# Patient Record
Sex: FEMALE | Race: WHITE | NOT HISPANIC OR LATINO
[De-identification: names, ages, dates, MRNs, and addresses within clinical notes are randomized per-mention and may not be internally consistent; named-entity substitution may affect disease eponyms.]

---

## 2017-03-01 ENCOUNTER — APPOINTMENT (OUTPATIENT)
Dept: FAMILY MEDICINE | Facility: CLINIC | Age: 57
End: 2017-03-01

## 2017-03-01 VITALS — DIASTOLIC BLOOD PRESSURE: 80 MMHG | TEMPERATURE: 98.6 F | SYSTOLIC BLOOD PRESSURE: 120 MMHG

## 2017-03-01 DIAGNOSIS — R20.0 ANESTHESIA OF SKIN: ICD-10-CM

## 2017-03-01 DIAGNOSIS — R20.2 ANESTHESIA OF SKIN: ICD-10-CM

## 2017-03-02 ENCOUNTER — OUTPATIENT (OUTPATIENT)
Dept: OUTPATIENT SERVICES | Facility: HOSPITAL | Age: 57
LOS: 1 days | End: 2017-03-02
Payer: COMMERCIAL

## 2017-03-02 ENCOUNTER — APPOINTMENT (OUTPATIENT)
Dept: RADIOLOGY | Facility: IMAGING CENTER | Age: 57
End: 2017-03-02

## 2017-03-02 DIAGNOSIS — N63 UNSPECIFIED LUMP IN BREAST: ICD-10-CM

## 2017-03-02 DIAGNOSIS — R92.2 INCONCLUSIVE MAMMOGRAM: ICD-10-CM

## 2017-03-02 DIAGNOSIS — R20.0 ANESTHESIA OF SKIN: ICD-10-CM

## 2017-03-02 DIAGNOSIS — R20.2 PARESTHESIA OF SKIN: ICD-10-CM

## 2017-03-02 DIAGNOSIS — Z00.00 ENCOUNTER FOR GENERAL ADULT MEDICAL EXAMINATION WITHOUT ABNORMAL FINDINGS: ICD-10-CM

## 2017-03-02 PROCEDURE — 72050 X-RAY EXAM NECK SPINE 4/5VWS: CPT

## 2017-03-02 PROCEDURE — 72050 X-RAY EXAM NECK SPINE 4/5VWS: CPT | Mod: 26

## 2017-03-02 PROCEDURE — 72070 X-RAY EXAM THORAC SPINE 2VWS: CPT | Mod: 26

## 2017-03-02 PROCEDURE — 72070 X-RAY EXAM THORAC SPINE 2VWS: CPT

## 2017-04-07 ENCOUNTER — OUTPATIENT (OUTPATIENT)
Dept: OUTPATIENT SERVICES | Facility: HOSPITAL | Age: 57
LOS: 1 days | End: 2017-04-07
Payer: COMMERCIAL

## 2017-04-07 ENCOUNTER — APPOINTMENT (OUTPATIENT)
Dept: ULTRASOUND IMAGING | Facility: IMAGING CENTER | Age: 57
End: 2017-04-07

## 2017-04-07 ENCOUNTER — APPOINTMENT (OUTPATIENT)
Dept: MAMMOGRAPHY | Facility: IMAGING CENTER | Age: 57
End: 2017-04-07

## 2017-04-07 DIAGNOSIS — Z00.8 ENCOUNTER FOR OTHER GENERAL EXAMINATION: ICD-10-CM

## 2017-04-07 PROCEDURE — 76641 ULTRASOUND BREAST COMPLETE: CPT

## 2017-04-07 PROCEDURE — 77066 DX MAMMO INCL CAD BI: CPT

## 2017-04-07 PROCEDURE — G0279: CPT

## 2017-04-22 ENCOUNTER — APPOINTMENT (OUTPATIENT)
Age: 57
End: 2017-04-22

## 2017-05-03 ENCOUNTER — RX RENEWAL (OUTPATIENT)
Age: 57
End: 2017-05-03

## 2017-05-04 ENCOUNTER — RX RENEWAL (OUTPATIENT)
Age: 57
End: 2017-05-04

## 2017-08-01 ENCOUNTER — APPOINTMENT (OUTPATIENT)
Dept: FAMILY MEDICINE | Facility: CLINIC | Age: 57
End: 2017-08-01
Payer: COMMERCIAL

## 2017-08-01 VITALS — SYSTOLIC BLOOD PRESSURE: 120 MMHG | DIASTOLIC BLOOD PRESSURE: 70 MMHG | TEMPERATURE: 98 F

## 2017-08-01 PROCEDURE — 99213 OFFICE O/P EST LOW 20 MIN: CPT

## 2017-08-03 ENCOUNTER — APPOINTMENT (OUTPATIENT)
Dept: FAMILY MEDICINE | Facility: CLINIC | Age: 57
End: 2017-08-03
Payer: COMMERCIAL

## 2017-08-03 VITALS — SYSTOLIC BLOOD PRESSURE: 120 MMHG | TEMPERATURE: 98.6 F | DIASTOLIC BLOOD PRESSURE: 80 MMHG

## 2017-08-03 DIAGNOSIS — L23.7 ALLERGIC CONTACT DERMATITIS DUE TO PLANTS, EXCEPT FOOD: ICD-10-CM

## 2017-08-03 PROCEDURE — 99213 OFFICE O/P EST LOW 20 MIN: CPT | Mod: 25

## 2017-08-03 PROCEDURE — 96372 THER/PROPH/DIAG INJ SC/IM: CPT

## 2017-08-03 RX ORDER — TRIAMCINOLONE ACETONIDE 40 MG/ML
40 INJECTION, SUSPENSION INTRA-ARTICULAR; INTRAMUSCULAR
Qty: 1 | Refills: 0 | Status: COMPLETED | OUTPATIENT
Start: 2017-08-03

## 2017-08-03 RX ADMIN — Medication 0.5 MG/ML: at 00:00

## 2017-09-01 ENCOUNTER — APPOINTMENT (OUTPATIENT)
Dept: INFECTIOUS DISEASE | Facility: CLINIC | Age: 57
End: 2017-09-01
Payer: SELF-PAY

## 2017-09-01 DIAGNOSIS — Z71.89 OTHER SPECIFIED COUNSELING: ICD-10-CM

## 2017-09-01 PROCEDURE — 90471 IMMUNIZATION ADMIN: CPT | Mod: NC

## 2017-09-01 PROCEDURE — 99401 PREV MED CNSL INDIV APPRX 15: CPT | Mod: 25

## 2017-09-01 PROCEDURE — 90691 TYPHOID VACCINE IM: CPT

## 2017-09-01 PROCEDURE — 90472 IMMUNIZATION ADMIN EACH ADD: CPT | Mod: NC

## 2017-09-01 PROCEDURE — 90632 HEPA VACCINE ADULT IM: CPT

## 2017-12-06 ENCOUNTER — APPOINTMENT (OUTPATIENT)
Dept: FAMILY MEDICINE | Facility: CLINIC | Age: 57
End: 2017-12-06
Payer: COMMERCIAL

## 2017-12-06 VITALS — DIASTOLIC BLOOD PRESSURE: 78 MMHG | SYSTOLIC BLOOD PRESSURE: 120 MMHG | TEMPERATURE: 98.9 F

## 2017-12-06 PROCEDURE — 99213 OFFICE O/P EST LOW 20 MIN: CPT | Mod: 25

## 2017-12-06 PROCEDURE — 87880 STREP A ASSAY W/OPTIC: CPT | Mod: QW

## 2017-12-06 RX ORDER — AZITHROMYCIN 250 MG/1
250 TABLET, FILM COATED ORAL
Qty: 4 | Refills: 0 | Status: DISCONTINUED | COMMUNITY
Start: 2017-09-01 | End: 2017-12-06

## 2017-12-11 LAB — BACTERIA THROAT CULT: ABNORMAL

## 2017-12-18 ENCOUNTER — APPOINTMENT (OUTPATIENT)
Dept: FAMILY MEDICINE | Facility: CLINIC | Age: 57
End: 2017-12-18
Payer: COMMERCIAL

## 2017-12-18 VITALS — DIASTOLIC BLOOD PRESSURE: 80 MMHG | SYSTOLIC BLOOD PRESSURE: 124 MMHG | TEMPERATURE: 99.1 F

## 2017-12-18 DIAGNOSIS — Z87.09 PERSONAL HISTORY OF OTHER DISEASES OF THE RESPIRATORY SYSTEM: ICD-10-CM

## 2017-12-18 PROCEDURE — 99213 OFFICE O/P EST LOW 20 MIN: CPT | Mod: 25

## 2017-12-18 PROCEDURE — 87880 STREP A ASSAY W/OPTIC: CPT | Mod: QW

## 2017-12-20 ENCOUNTER — RX RENEWAL (OUTPATIENT)
Age: 57
End: 2017-12-20

## 2017-12-20 LAB — S PYO AG SPEC QL IA: NEGATIVE

## 2017-12-21 ENCOUNTER — RX RENEWAL (OUTPATIENT)
Age: 57
End: 2017-12-21

## 2017-12-21 LAB — BACTERIA THROAT CULT: NORMAL

## 2018-02-01 ENCOUNTER — APPOINTMENT (OUTPATIENT)
Dept: FAMILY MEDICINE | Facility: CLINIC | Age: 58
End: 2018-02-01
Payer: COMMERCIAL

## 2018-02-01 VITALS — SYSTOLIC BLOOD PRESSURE: 130 MMHG | DIASTOLIC BLOOD PRESSURE: 80 MMHG | TEMPERATURE: 98.3 F

## 2018-02-01 DIAGNOSIS — R05 COUGH: ICD-10-CM

## 2018-02-01 DIAGNOSIS — Z23 ENCOUNTER FOR IMMUNIZATION: ICD-10-CM

## 2018-02-01 DIAGNOSIS — J10.1 INFLUENZA DUE TO OTHER IDENTIFIED INFLUENZA VIRUS WITH OTHER RESPIRATORY MANIFESTATIONS: ICD-10-CM

## 2018-02-01 DIAGNOSIS — H66.91 OTITIS MEDIA, UNSPECIFIED, RIGHT EAR: ICD-10-CM

## 2018-02-01 LAB
FLUAV SPEC QL CULT: NEGATIVE
FLUBV AG SPEC QL IA: POSITIVE

## 2018-02-01 PROCEDURE — 87804 INFLUENZA ASSAY W/OPTIC: CPT | Mod: QW

## 2018-02-01 PROCEDURE — 99214 OFFICE O/P EST MOD 30 MIN: CPT | Mod: 25

## 2018-02-01 RX ORDER — METHYLPREDNISOLONE 4 MG/1
4 TABLET ORAL
Qty: 1 | Refills: 0 | Status: DISCONTINUED | COMMUNITY
Start: 2017-08-03 | End: 2018-02-01

## 2018-02-01 RX ORDER — AMOXICILLIN 500 MG/1
500 CAPSULE ORAL TWICE DAILY
Qty: 20 | Refills: 0 | Status: DISCONTINUED | COMMUNITY
Start: 2017-12-06 | End: 2018-02-01

## 2018-02-01 RX ORDER — VALACYCLOVIR 1 G/1
1 TABLET, FILM COATED ORAL
Qty: 14 | Refills: 0 | Status: DISCONTINUED | COMMUNITY
Start: 2018-01-09

## 2018-02-01 RX ORDER — CLINDAMYCIN HYDROCHLORIDE 300 MG/1
300 CAPSULE ORAL EVERY 8 HOURS
Qty: 30 | Refills: 0 | Status: DISCONTINUED | COMMUNITY
Start: 2017-12-20 | End: 2018-02-01

## 2018-02-01 RX ORDER — HYDROCORTISONE 25 MG/G
2.5 OINTMENT TOPICAL
Qty: 1 | Refills: 1 | Status: DISCONTINUED | COMMUNITY
Start: 2017-08-01 | End: 2018-02-01

## 2018-02-01 RX ORDER — METHYLPREDNISOLONE 4 MG/1
4 TABLET ORAL
Qty: 1 | Refills: 0 | Status: DISCONTINUED | COMMUNITY
Start: 2017-12-06 | End: 2018-02-01

## 2018-04-08 ENCOUNTER — TRANSCRIPTION ENCOUNTER (OUTPATIENT)
Age: 58
End: 2018-04-08

## 2018-04-10 ENCOUNTER — APPOINTMENT (OUTPATIENT)
Dept: FAMILY MEDICINE | Facility: CLINIC | Age: 58
End: 2018-04-10
Payer: COMMERCIAL

## 2018-04-10 VITALS — SYSTOLIC BLOOD PRESSURE: 116 MMHG | TEMPERATURE: 98.4 F | DIASTOLIC BLOOD PRESSURE: 62 MMHG

## 2018-04-10 DIAGNOSIS — R53.1 WEAKNESS: ICD-10-CM

## 2018-04-10 DIAGNOSIS — K62.5 HEMORRHAGE OF ANUS AND RECTUM: ICD-10-CM

## 2018-04-10 DIAGNOSIS — A08.4 VIRAL INTESTINAL INFECTION, UNSPECIFIED: ICD-10-CM

## 2018-04-10 DIAGNOSIS — R10.32 LEFT LOWER QUADRANT PAIN: ICD-10-CM

## 2018-04-10 PROCEDURE — 99214 OFFICE O/P EST MOD 30 MIN: CPT

## 2018-04-13 ENCOUNTER — APPOINTMENT (OUTPATIENT)
Dept: ULTRASOUND IMAGING | Facility: IMAGING CENTER | Age: 58
End: 2018-04-13
Payer: COMMERCIAL

## 2018-04-13 ENCOUNTER — APPOINTMENT (OUTPATIENT)
Dept: MAMMOGRAPHY | Facility: IMAGING CENTER | Age: 58
End: 2018-04-13
Payer: COMMERCIAL

## 2018-04-13 ENCOUNTER — OUTPATIENT (OUTPATIENT)
Dept: OUTPATIENT SERVICES | Facility: HOSPITAL | Age: 58
LOS: 1 days | End: 2018-04-13
Payer: COMMERCIAL

## 2018-04-13 DIAGNOSIS — Z00.00 ENCOUNTER FOR GENERAL ADULT MEDICAL EXAMINATION WITHOUT ABNORMAL FINDINGS: ICD-10-CM

## 2018-04-13 PROCEDURE — 77067 SCR MAMMO BI INCL CAD: CPT | Mod: 26

## 2018-04-13 PROCEDURE — 77063 BREAST TOMOSYNTHESIS BI: CPT | Mod: 26

## 2018-04-13 PROCEDURE — 77063 BREAST TOMOSYNTHESIS BI: CPT

## 2018-04-13 PROCEDURE — 77067 SCR MAMMO BI INCL CAD: CPT

## 2018-04-13 PROCEDURE — 76641 ULTRASOUND BREAST COMPLETE: CPT

## 2018-04-13 PROCEDURE — 76641 ULTRASOUND BREAST COMPLETE: CPT | Mod: 26,50

## 2018-04-19 ENCOUNTER — MESSAGE (OUTPATIENT)
Age: 58
End: 2018-04-19

## 2018-06-29 ENCOUNTER — APPOINTMENT (OUTPATIENT)
Dept: FAMILY MEDICINE | Facility: CLINIC | Age: 58
End: 2018-06-29

## 2018-07-13 ENCOUNTER — APPOINTMENT (OUTPATIENT)
Dept: FAMILY MEDICINE | Facility: CLINIC | Age: 58
End: 2018-07-13

## 2018-08-10 ENCOUNTER — APPOINTMENT (OUTPATIENT)
Dept: FAMILY MEDICINE | Facility: CLINIC | Age: 58
End: 2018-08-10

## 2018-09-04 ENCOUNTER — RESULT REVIEW (OUTPATIENT)
Age: 58
End: 2018-09-04

## 2018-09-26 ENCOUNTER — RX RENEWAL (OUTPATIENT)
Age: 58
End: 2018-09-26

## 2018-09-27 ENCOUNTER — RX RENEWAL (OUTPATIENT)
Age: 58
End: 2018-09-27

## 2018-10-24 ENCOUNTER — RX RENEWAL (OUTPATIENT)
Age: 58
End: 2018-10-24

## 2018-11-05 ENCOUNTER — APPOINTMENT (OUTPATIENT)
Dept: UROGYNECOLOGY | Facility: CLINIC | Age: 58
End: 2018-11-05

## 2019-08-05 PROBLEM — R10.32 LEFT LOWER QUADRANT PAIN: Status: ACTIVE | Noted: 2018-04-10

## 2019-08-21 ENCOUNTER — OUTPATIENT (OUTPATIENT)
Dept: OUTPATIENT SERVICES | Facility: HOSPITAL | Age: 59
LOS: 1 days | End: 2019-08-21
Payer: COMMERCIAL

## 2019-08-21 ENCOUNTER — APPOINTMENT (OUTPATIENT)
Dept: ULTRASOUND IMAGING | Facility: IMAGING CENTER | Age: 59
End: 2019-08-21
Payer: COMMERCIAL

## 2019-08-21 ENCOUNTER — APPOINTMENT (OUTPATIENT)
Dept: MAMMOGRAPHY | Facility: IMAGING CENTER | Age: 59
End: 2019-08-21
Payer: COMMERCIAL

## 2019-08-21 DIAGNOSIS — Z00.8 ENCOUNTER FOR OTHER GENERAL EXAMINATION: ICD-10-CM

## 2019-08-21 PROCEDURE — 76641 ULTRASOUND BREAST COMPLETE: CPT | Mod: 26,50

## 2019-08-21 PROCEDURE — 77067 SCR MAMMO BI INCL CAD: CPT | Mod: 26

## 2019-08-21 PROCEDURE — 77063 BREAST TOMOSYNTHESIS BI: CPT | Mod: 26

## 2019-08-21 PROCEDURE — 77067 SCR MAMMO BI INCL CAD: CPT

## 2019-08-21 PROCEDURE — 76641 ULTRASOUND BREAST COMPLETE: CPT

## 2019-08-21 PROCEDURE — 77063 BREAST TOMOSYNTHESIS BI: CPT

## 2019-11-19 ENCOUNTER — APPOINTMENT (OUTPATIENT)
Dept: FAMILY MEDICINE | Facility: CLINIC | Age: 59
End: 2019-11-19
Payer: COMMERCIAL

## 2019-11-19 DIAGNOSIS — M25.511 PAIN IN RIGHT SHOULDER: ICD-10-CM

## 2019-11-19 PROCEDURE — 99214 OFFICE O/P EST MOD 30 MIN: CPT

## 2019-11-19 NOTE — ADDENDUM
[FreeTextEntry1] : I, Shalini Lombardi, acted solely as a scribe for Dr. Mayo on this date 11/19/2019.\par \par All medical record entries made by the Scribe were at my, Dr. Mayo, direction and personally dictated by me on 11/19/2019. I have reviewed the chart and agree that the record accurately reflects my personal performance of the history, physical exam, assessment and plan.  I have also personally directed, reviewed and agreed with the chart.

## 2019-11-19 NOTE — HISTORY OF PRESENT ILLNESS
[FreeTextEntry8] : 58y/o F with PMHx of Anxiety and Depression presents to the office with c/o persistent right arm pain for ~6 wks. Pt describes shooting, burning pain that can sometimes be severity of 10/10, currently 3-4/10 in office. Pain worse at night and exacerbated with lifting arm or reaching back. Pt stopped carrying bag over right shoulder, but pain has persisted. Pt has taken Advil. Pt notes she started new job and often at computer. Denies radiation, redness, swelling, numbness or tingling. Denies injury or trauma.Pt c/o increasing depression. Patient started a new job which she isn't happy with. Also requests a renewal of her Cymbalta

## 2019-11-19 NOTE — PLAN
[FreeTextEntry1] : Right arm pain/Rotator cuff vs.Impingment \par - Diclofenac 75mg BID x 2 wks\par - Alternate ice and heat 15-20 min\par - Referral to PT\par - Call office in 2 wks\par \par Depression/Anxiety\par -Refill Cymbalta

## 2019-11-19 NOTE — REVIEW OF SYSTEMS
[Joint Pain] : joint pain [Muscle Pain] : muscle pain [Negative] : Neurological [Depression] : depression [Joint Swelling] : no joint swelling [FreeTextEntry9] : right shoulder pain

## 2019-11-19 NOTE — PHYSICAL EXAM
[Normal] : no joint swelling, grossly normal strength/tone [de-identified] : Warm, dry, intact. No rashes.  [de-identified] : AA&Ox3  [de-identified] : mild decrease ROM in Abduction

## 2020-01-20 ENCOUNTER — RX CHANGE (OUTPATIENT)
Age: 60
End: 2020-01-20

## 2020-03-18 ENCOUNTER — RX RENEWAL (OUTPATIENT)
Age: 60
End: 2020-03-18

## 2020-06-04 ENCOUNTER — APPOINTMENT (OUTPATIENT)
Dept: FAMILY MEDICINE | Facility: CLINIC | Age: 60
End: 2020-06-04
Payer: COMMERCIAL

## 2020-06-04 VITALS — TEMPERATURE: 98.4 F | SYSTOLIC BLOOD PRESSURE: 136 MMHG | DIASTOLIC BLOOD PRESSURE: 80 MMHG

## 2020-06-04 VITALS — WEIGHT: 197 LBS | BODY MASS INDEX: 32.78 KG/M2

## 2020-06-04 DIAGNOSIS — F10.10 ALCOHOL ABUSE, UNCOMPLICATED: ICD-10-CM

## 2020-06-04 DIAGNOSIS — S76.311A STRAIN OF MUSCLE, FASCIA AND TENDON OF THE POSTERIOR MUSCLE GROUP AT THIGH LEVEL, RIGHT THIGH, INITIAL ENCOUNTER: ICD-10-CM

## 2020-06-04 PROCEDURE — 99214 OFFICE O/P EST MOD 30 MIN: CPT

## 2020-06-04 PROCEDURE — G0447 BEHAVIOR COUNSEL OBESITY 15M: CPT

## 2020-06-04 PROCEDURE — G0443: CPT

## 2020-06-04 NOTE — REVIEW OF SYSTEMS
[Recent Change In Weight] : ~T recent weight change [Muscle Pain] : muscle pain [Unsteady Walking] : ataxia [Depression] : depression [Negative] : Integumentary [FreeTextEntry9] : right hamstring pain

## 2020-06-04 NOTE — COUNSELING
[AUDIT-C Screening administered and reviewed] : AUDIT-C Screening administered and reviewed [Hazards of at-risk alcohol use discussed] : Hazards of at-risk alcohol use discussed [Strategies to reduce or eliminate alcohol use discussed] : Strategies to reduce or eliminate alcohol use discussed [FreeTextEntry1] : 15 [Potential consequences of obesity discussed] : Potential consequences of obesity discussed [Structured Weight Management Program suggested:] : Structured weight management program suggested [Benefits of weight loss discussed] : Benefits of weight loss discussed [Encouraged to maintain food diary] : Encouraged to maintain food diary [Encouraged to use exercise tracking device] : Encouraged to use exercise tracking device [Encouraged to increase physical activity] : Encouraged to increase physical activity [FreeTextEntry4] : 15

## 2020-06-04 NOTE — PHYSICAL EXAM
[No Joint Swelling] : no joint swelling [Grossly Normal Strength/Tone] : grossly normal strength/tone [Normal Gait] : normal gait [Normal] : affect was normal and insight and judgment were intact [de-identified] : right hamstring TTP , right post/lateral aspect of k nee TTP

## 2020-06-04 NOTE — HISTORY OF PRESENT ILLNESS
[FreeTextEntry8] : 61 y/o F (Depression) presents to office for right leg pain for several weeks. pt has been walking increased her distance over thelast few weeks. Pt starts in hamstring of  right leg. Painful to walk. Can get shooting pain which radiates  into right posterior knee lasting few seconds. Admits to some swelling today of lateral knee.Pain kept her up last night. Walked 3 miles last night.Admits to weight gain in the last 3 months due to Covid and drinking 1 bottle of wine nightly

## 2020-06-21 ENCOUNTER — RX RENEWAL (OUTPATIENT)
Age: 60
End: 2020-06-21

## 2020-10-04 ENCOUNTER — TRANSCRIPTION ENCOUNTER (OUTPATIENT)
Age: 60
End: 2020-10-04

## 2020-10-21 ENCOUNTER — TRANSCRIPTION ENCOUNTER (OUTPATIENT)
Age: 60
End: 2020-10-21

## 2020-10-22 ENCOUNTER — RX RENEWAL (OUTPATIENT)
Age: 60
End: 2020-10-22

## 2020-10-23 ENCOUNTER — APPOINTMENT (OUTPATIENT)
Dept: ORTHOPEDIC SURGERY | Facility: CLINIC | Age: 60
End: 2020-10-23

## 2020-10-28 ENCOUNTER — APPOINTMENT (OUTPATIENT)
Dept: ORTHOPEDIC SURGERY | Facility: CLINIC | Age: 60
End: 2020-10-28
Payer: COMMERCIAL

## 2020-10-28 VITALS
HEART RATE: 70 BPM | BODY MASS INDEX: 30.99 KG/M2 | HEIGHT: 65 IN | TEMPERATURE: 97.8 F | SYSTOLIC BLOOD PRESSURE: 133 MMHG | WEIGHT: 186 LBS | DIASTOLIC BLOOD PRESSURE: 78 MMHG

## 2020-10-28 DIAGNOSIS — Z80.9 FAMILY HISTORY OF MALIGNANT NEOPLASM, UNSPECIFIED: ICD-10-CM

## 2020-10-28 DIAGNOSIS — Z82.61 FAMILY HISTORY OF ARTHRITIS: ICD-10-CM

## 2020-10-28 PROCEDURE — 99072 ADDL SUPL MATRL&STAF TM PHE: CPT

## 2020-10-28 PROCEDURE — 99203 OFFICE O/P NEW LOW 30 MIN: CPT

## 2020-10-28 NOTE — ADDENDUM
----- Message from Andrés Ragsdale MD sent at 12/28/2018  7:35 AM CST -----  Results are normal.  Please contact patient to inform.  Follow up as scheduled.   [FreeTextEntry1] : I, Karrie Cabral, acted solely as a scribe for Dr. Edwards on this date 10/23/2020.

## 2020-10-28 NOTE — PHYSICAL EXAM
[de-identified] : - Constitutional: This is a female in no obvious distress.  \par - Psych: Patient is alert and oriented to person, place and time.  Patient has a normal mood and affect.\par - Cardiovascular: Normal pulses throughout the upper extremities.  No significant varicosities are noted in the upper extremities. \par - Neuro: Strength and sensation are intact throughout the upper extremities.  Patient has normal coordination.\par - Respiratory:  Patient exhibits no evidence of shortness of breath or difficulty breathing.\par - Skin: No rashes, lesions, or other abnormalities are noted in the upper extremities.\par \par ---\par  \par Examination of her right index finger demonstrates diffuse swelling at the PIP joint.  She is tender along the collateral ligaments and less so dorsally.  She has full extension.  There is limitation of terminal flexion.  There is no instability of the PIP joint collateral ligaments.  There is no acute swelling or tenderness along the DIP joint.  She is neurovascularly intact distally. [de-identified] : Reviewed her radiographs which demonstrated no obvious fractures or dislocations.  There is a small calcification noted dorsally at the DIP joint which appears degenerative.

## 2020-10-28 NOTE — DISCUSSION/SUMMARY
[FreeTextEntry1] : She has findings consistent with a right index finger PIP joint sprain after an injury 10 days ago.  There is no evidence of an acute fracture or instability.\par \par I had a discussion regarding today's visit, the diagnosis, and treatment recommendations / options. At this time, I recommend she discontinue using the splint.  She was instructed on use of Coban wrap and flexion and extension exercises.  She will follow-up with in 3 weeks to assess her progress.\par \par The patient has agreed to this plan of management and has expressed full understanding.  All questions were fully answered to the patient's satisfaction.\par \par Over 50% of the time spent with the patient was on counseling the patient on the above diagnosis, treatment plan and prognosis.

## 2020-10-28 NOTE — HISTORY OF PRESENT ILLNESS
[FreeTextEntry1] : She comes in today for evaluation of right index finger injury, which occurred 10 days ago. She got the finger caught in her dog's collar. She went to urgent care where x-rays demonstrated tiny nonspecific ossific density adjacent to the base of the index finger distal phalanx, question small avulsed fragment. She was fitted with a splint, which she has since removed multiple times. Currently she reports pain along the PIP joint of the finger with associated swelling. She is unable to fully flex the finger into the palm. She rates her pain an 8 out of 10 at this time.

## 2020-11-04 PROBLEM — S63.630A SPRAIN OF INTERPHALANGEAL JOINT OF RIGHT INDEX FINGER: Status: ACTIVE | Noted: 2020-10-28

## 2020-11-11 ENCOUNTER — APPOINTMENT (OUTPATIENT)
Dept: ORTHOPEDIC SURGERY | Facility: CLINIC | Age: 60
End: 2020-11-11

## 2020-11-11 DIAGNOSIS — S63.630A SPRAIN OF INTERPHALANGEAL JOINT OF RIGHT INDEX FINGER, INITIAL ENCOUNTER: ICD-10-CM

## 2020-12-15 PROBLEM — Z87.09 HISTORY OF ACUTE SINUSITIS: Status: RESOLVED | Noted: 2017-12-18 | Resolved: 2020-12-15

## 2020-12-15 PROBLEM — Z87.09 HISTORY OF SORE THROAT: Status: RESOLVED | Noted: 2017-12-06 | Resolved: 2020-12-15

## 2020-12-16 PROBLEM — H66.91 RIGHT OTITIS MEDIA: Status: RESOLVED | Noted: 2018-02-01 | Resolved: 2020-12-16

## 2021-01-12 ENCOUNTER — TRANSCRIPTION ENCOUNTER (OUTPATIENT)
Age: 61
End: 2021-01-12

## 2021-02-05 ENCOUNTER — TRANSCRIPTION ENCOUNTER (OUTPATIENT)
Age: 61
End: 2021-02-05

## 2021-03-09 ENCOUNTER — OUTPATIENT (OUTPATIENT)
Dept: OUTPATIENT SERVICES | Facility: HOSPITAL | Age: 61
LOS: 1 days | End: 2021-03-09
Payer: COMMERCIAL

## 2021-03-09 ENCOUNTER — APPOINTMENT (OUTPATIENT)
Dept: MAMMOGRAPHY | Facility: IMAGING CENTER | Age: 61
End: 2021-03-09
Payer: COMMERCIAL

## 2021-03-09 ENCOUNTER — RESULT REVIEW (OUTPATIENT)
Age: 61
End: 2021-03-09

## 2021-03-09 ENCOUNTER — APPOINTMENT (OUTPATIENT)
Dept: ULTRASOUND IMAGING | Facility: IMAGING CENTER | Age: 61
End: 2021-03-09
Payer: COMMERCIAL

## 2021-03-09 DIAGNOSIS — Z00.8 ENCOUNTER FOR OTHER GENERAL EXAMINATION: ICD-10-CM

## 2021-03-09 PROCEDURE — 77063 BREAST TOMOSYNTHESIS BI: CPT

## 2021-03-09 PROCEDURE — 77063 BREAST TOMOSYNTHESIS BI: CPT | Mod: 26

## 2021-03-09 PROCEDURE — 76641 ULTRASOUND BREAST COMPLETE: CPT | Mod: 26,50

## 2021-03-09 PROCEDURE — 77067 SCR MAMMO BI INCL CAD: CPT | Mod: 26

## 2021-03-09 PROCEDURE — 77067 SCR MAMMO BI INCL CAD: CPT

## 2021-03-09 PROCEDURE — 76641 ULTRASOUND BREAST COMPLETE: CPT

## 2021-04-27 ENCOUNTER — RESULT REVIEW (OUTPATIENT)
Age: 61
End: 2021-04-27

## 2021-05-04 ENCOUNTER — NON-APPOINTMENT (OUTPATIENT)
Age: 61
End: 2021-05-04

## 2021-05-04 ENCOUNTER — APPOINTMENT (OUTPATIENT)
Dept: FAMILY MEDICINE | Facility: CLINIC | Age: 61
End: 2021-05-04
Payer: COMMERCIAL

## 2021-05-04 DIAGNOSIS — M25.432 EFFUSION, LEFT WRIST: ICD-10-CM

## 2021-05-04 DIAGNOSIS — M25.532 PAIN IN LEFT WRIST: ICD-10-CM

## 2021-05-04 DIAGNOSIS — Z71.89 OTHER SPECIFIED COUNSELING: ICD-10-CM

## 2021-05-04 DIAGNOSIS — Z00.00 ENCOUNTER FOR GENERAL ADULT MEDICAL EXAMINATION W/OUT ABNORMAL FINDINGS: ICD-10-CM

## 2021-05-04 DIAGNOSIS — F10.20 ALCOHOL DEPENDENCE, UNCOMPLICATED: ICD-10-CM

## 2021-05-04 DIAGNOSIS — M85.642 OTHER CYST OF BONE, LEFT HAND: ICD-10-CM

## 2021-05-04 DIAGNOSIS — R63.5 ABNORMAL WEIGHT GAIN: ICD-10-CM

## 2021-05-04 PROCEDURE — 99072 ADDL SUPL MATRL&STAF TM PHE: CPT

## 2021-05-04 PROCEDURE — 93000 ELECTROCARDIOGRAM COMPLETE: CPT | Mod: 59

## 2021-05-04 PROCEDURE — G0447 BEHAVIOR COUNSEL OBESITY 15M: CPT

## 2021-05-04 PROCEDURE — 99396 PREV VISIT EST AGE 40-64: CPT | Mod: 25

## 2021-05-04 PROCEDURE — G0443: CPT

## 2021-05-04 NOTE — COUNSELING
[AUDIT-C Screening administered and reviewed] : AUDIT-C Screening administered and reviewed [Hazards of at-risk alcohol use discussed] : Hazards of at-risk alcohol use discussed [Strategies to reduce or eliminate alcohol use discussed] : Strategies to reduce or eliminate alcohol use discussed [Support options provided] : Support options provided [FreeTextEntry1] : 15 [Potential consequences of obesity discussed] : Potential consequences of obesity discussed [Benefits of weight loss discussed] : Benefits of weight loss discussed [Structured Weight Management Program suggested:] : Structured weight management program suggested [Encouraged to maintain food diary] : Encouraged to maintain food diary [Encouraged to increase physical activity] : Encouraged to increase physical activity [Encouraged to use exercise tracking device] : Encouraged to use exercise tracking device [FreeTextEntry4] : 15

## 2021-05-04 NOTE — HISTORY OF PRESENT ILLNESS
[de-identified] : 62 y/o F (Depression) presents to office for routine pE. Pt has not had a PE in many years.Has never had a colonoscopy. Not had Covid VAccine. Has gained 50 pounds in 5 years. Drinking spiked seltzers(4) nightly (5% alcohol) and not exercising. Feels very sad. Patient has family issues with her mother and is feeling overwhelmed and depressed.Not compliant with diet or exercisePt also complaining of swelling of the left wrist denies any injury or trauma. Patient states that several weeks before swelling she did get some bites on the left hand and wrist. Also complaining of slightly painful bump in her left hand.

## 2021-05-04 NOTE — REVIEW OF SYSTEMS
[Recent Change In Weight] : ~T recent weight change [Negative] : Heme/Lymph [Anxiety] : anxiety [Depression] : depression [FreeTextEntry9] : left wrist pain and swelling [de-identified] : small bump in left hand

## 2021-05-04 NOTE — PHYSICAL EXAM
[No Acute Distress] : no acute distress [Well Nourished] : well nourished [Well Developed] : well developed [Well-Appearing] : well-appearing [Normal Sclera/Conjunctiva] : normal sclera/conjunctiva [PERRL] : pupils equal round and reactive to light [EOMI] : extraocular movements intact [Normal Outer Ear/Nose] : the outer ears and nose were normal in appearance [Normal Oropharynx] : the oropharynx was normal [No JVD] : no jugular venous distention [No Lymphadenopathy] : no lymphadenopathy [Supple] : supple [Thyroid Normal, No Nodules] : the thyroid was normal and there were no nodules present [No Respiratory Distress] : no respiratory distress  [No Accessory Muscle Use] : no accessory muscle use [Clear to Auscultation] : lungs were clear to auscultation bilaterally [Normal Rate] : normal rate  [Regular Rhythm] : with a regular rhythm [Normal S1, S2] : normal S1 and S2 [No Murmur] : no murmur heard [No Carotid Bruits] : no carotid bruits [No Abdominal Bruit] : a ~M bruit was not heard ~T in the abdomen [No Varicosities] : no varicosities [Pedal Pulses Present] : the pedal pulses are present [No Edema] : there was no peripheral edema [No Palpable Aorta] : no palpable aorta [No Extremity Clubbing/Cyanosis] : no extremity clubbing/cyanosis [Soft] : abdomen soft [Non Tender] : non-tender [Non-distended] : non-distended [No Masses] : no abdominal mass palpated [No HSM] : no HSM [Normal Bowel Sounds] : normal bowel sounds [Normal Posterior Cervical Nodes] : no posterior cervical lymphadenopathy [Normal Anterior Cervical Nodes] : no anterior cervical lymphadenopathy [No CVA Tenderness] : no CVA  tenderness [No Spinal Tenderness] : no spinal tenderness [Grossly Normal Strength/Tone] : grossly normal strength/tone [No Rash] : no rash [Coordination Grossly Intact] : coordination grossly intact [No Focal Deficits] : no focal deficits [Normal Gait] : normal gait [Deep Tendon Reflexes (DTR)] : deep tendon reflexes were 2+ and symmetric [Normal Insight/Judgement] : insight and judgment were intact [de-identified] : left wrist mildly swollen and TTP [de-identified] : depressed  mood [de-identified] : small non tedner cyst firm non mobile in palmar surface of lefthand

## 2021-05-11 ENCOUNTER — NON-APPOINTMENT (OUTPATIENT)
Age: 61
End: 2021-05-11

## 2021-05-19 ENCOUNTER — NON-APPOINTMENT (OUTPATIENT)
Age: 61
End: 2021-05-19

## 2021-05-19 ENCOUNTER — APPOINTMENT (OUTPATIENT)
Dept: ORTHOPEDIC SURGERY | Facility: CLINIC | Age: 61
End: 2021-05-19
Payer: COMMERCIAL

## 2021-05-19 VITALS — TEMPERATURE: 97.3 F

## 2021-05-19 DIAGNOSIS — E78.2 MIXED HYPERLIPIDEMIA: ICD-10-CM

## 2021-05-19 DIAGNOSIS — Z78.9 OTHER SPECIFIED HEALTH STATUS: ICD-10-CM

## 2021-05-19 LAB
25(OH)D3 SERPL-MCNC: 23.1 NG/ML
ALBUMIN SERPL ELPH-MCNC: 4.5 G/DL
ALP BLD-CCNC: 94 U/L
ALT SERPL-CCNC: 20 U/L
ANION GAP SERPL CALC-SCNC: 13 MMOL/L
AST SERPL-CCNC: 22 U/L
BASOPHILS # BLD AUTO: 0.04 K/UL
BASOPHILS NFR BLD AUTO: 0.8 %
BILIRUB SERPL-MCNC: 0.6 MG/DL
BUN SERPL-MCNC: 18 MG/DL
CALCIUM SERPL-MCNC: 9.3 MG/DL
CHLORIDE SERPL-SCNC: 102 MMOL/L
CHOLEST SERPL-MCNC: 214 MG/DL
CO2 SERPL-SCNC: 23 MMOL/L
CREAT SERPL-MCNC: 0.6 MG/DL
EOSINOPHIL # BLD AUTO: 0.05 K/UL
EOSINOPHIL NFR BLD AUTO: 1 %
ESTIMATED AVERAGE GLUCOSE: 105 MG/DL
FOLATE SERPL-MCNC: 11.1 NG/ML
GLUCOSE SERPL-MCNC: 100 MG/DL
HBA1C MFR BLD HPLC: 5.3 %
HCT VFR BLD CALC: 43.8 %
HDLC SERPL-MCNC: 101 MG/DL
HGB BLD-MCNC: 13.3 G/DL
IMM GRANULOCYTES NFR BLD AUTO: 0.2 %
IRON SERPL-MCNC: 79 UG/DL
LDLC SERPL CALC-MCNC: 101 MG/DL
LYMPHOCYTES # BLD AUTO: 1.53 K/UL
LYMPHOCYTES NFR BLD AUTO: 30.7 %
MAN DIFF?: NORMAL
MCHC RBC-ENTMCNC: 28.3 PG
MCHC RBC-ENTMCNC: 30.4 GM/DL
MCV RBC AUTO: 93.2 FL
MONOCYTES # BLD AUTO: 0.42 K/UL
MONOCYTES NFR BLD AUTO: 8.4 %
NEUTROPHILS # BLD AUTO: 2.93 K/UL
NEUTROPHILS NFR BLD AUTO: 58.9 %
NONHDLC SERPL-MCNC: 113 MG/DL
PLATELET # BLD AUTO: 251 K/UL
POTASSIUM SERPL-SCNC: 4.5 MMOL/L
PROT SERPL-MCNC: 6.7 G/DL
RBC # BLD: 4.7 M/UL
RBC # FLD: 12.8 %
SODIUM SERPL-SCNC: 138 MMOL/L
TRIGL SERPL-MCNC: 61 MG/DL
TSH SERPL-ACNC: 1.55 UIU/ML
VIT B12 SERPL-MCNC: 636 PG/ML
WBC # FLD AUTO: 4.98 K/UL

## 2021-05-19 PROCEDURE — 99214 OFFICE O/P EST MOD 30 MIN: CPT | Mod: 25

## 2021-05-19 PROCEDURE — 73130 X-RAY EXAM OF HAND: CPT | Mod: LT

## 2021-05-19 PROCEDURE — 20605 DRAIN/INJ JOINT/BURSA W/O US: CPT | Mod: LT

## 2021-05-19 PROCEDURE — 73110 X-RAY EXAM OF WRIST: CPT | Mod: LT

## 2021-05-19 NOTE — CONSULT LETTER
[Dear  ___] : Dear  [unfilled], [Consult Letter:] : I had the pleasure of evaluating your patient, [unfilled]. [Please see my note below.] : Please see my note below. [Consult Closing:] : Thank you very much for allowing me to participate in the care of this patient.  If you have any questions, please do not hesitate to contact me. [Sincerely,] : Sincerely, [FreeTextEntry3] : Yogesh Edwards M.D.\par Surgery of the Hand & Upper Extremity\par Orthopaedic Surgery\par Chief, Hand Service, [facility]\par Director, Hand Service, [facility]\par  of Orthopedic Surgery, [facility] \par [facility] Email: [email or web address] Office Phone: [phone]

## 2021-05-19 NOTE — DISCUSSION/SUMMARY
[FreeTextEntry1] : She has findings consistent with left wrist and thumb pain secondary to basal joint arthritis with an element of tendinitis.  She also has Dupuytren's disease along the left ring finger ray without a contracture.\par \par I had a discussion regarding today's visit, the prognosis of this diagnosis and treatment recommendations and options.  \par \par With regard to her left hand nodules, I discussed the nature of Dupuytren's disease with her, and the potential for contracture. If she develops a contracture or symptoms, then she will return to the office to discuss further treatment recommendations. \par \par With regard to her left thumb pain, we discussed the options of antiinflammatory medication versus a cortisone injection. She agreed to proceed with a cortisone injection today. She was instructed on the use of ice and antiinflammatories as needed. \par \par The patient has agreed to this plan of management and has expressed full understanding.  All questions were fully answered to the patient's satisfaction.\par \par My cumulative time spent on this patient's visit included: Preparation for the visit, review of the medical records, review of pertinent diagnostic studies, examination and counseling of the patient on the above diagnosis, treatment plan and prognosis, orders of diagnostic tests, medications and/or appropriate procedures and documentation in the medical records of today's visit.

## 2021-05-19 NOTE — HISTORY OF PRESENT ILLNESS
[FreeTextEntry1] : She comes in today for evaluation of left hand and wrist pain, which began 2 months ago. She states that she initially developed 2 nodules in her left palm. The lumps have recently been increasing in size. She later developed pain at the base of her left thumb that radiates into the wrist. She rates her pain an 9 out of 10 at this time. \par \par I saw her on October 28, 2020 regarding a right index finger PIP joint sprain.  Symptomatic treatment was discussed.\par \par She was referred by her PCP, Dr. Amira Mayo.

## 2021-05-19 NOTE — ADDENDUM
[FreeTextEntry1] : I, Karrie Cabral, acted solely as a scribe for Dr. Edwards on this date 05/19/2021.

## 2021-05-19 NOTE — PHYSICAL EXAM
[de-identified] : - Constitutional: This is a female in no obvious distress.  \par - Psych: Patient is alert and oriented to person, place and time.  Patient has a normal mood and affect.\par - Cardiovascular: Normal pulses throughout the upper extremities.  No significant varicosities are noted in the upper extremities. \par - Neuro: Strength and sensation are intact throughout the upper extremities.  Patient has normal coordination.\par - Respiratory:  Patient exhibits no evidence of shortness of breath or difficulty breathing.\par - Skin: No rashes, lesions, or other abnormalities are noted in the upper extremities.\par \par ---\par \par Examination of her left wrist and hand demonstrates swelling along the CMC joint of the thumb.  She is tender in this region.  She also has mild swelling and tenderness more proximally along the tendons of the first and second dorsal compartment.  She has negative Finkelstein sign.  There is no evidence of a trigger thumb.  She has thickening of the palmar fascia with nodules along the ring finger ray consistent with Dupuytren's disease.  There is no contracture.  She is neurovascularly intact distally. [de-identified] : PA, lateral, and oblique radiographs of the left wrist and hand demonstrate moderate to advanced basal joint arthritis of the thumb.

## 2021-05-19 NOTE — END OF VISIT
[FreeTextEntry3] : This note was written by Karrie Cabral on 05/19/2021 acting solely as a scribe for Dr. Yogesh Edwards.\par  \par All medical record entries made by the Scribe were at my, Dr. Yogesh Edwards, direction and personally dictated by me on 05/19/2021. I have personally reviewed the chart and agree that the record accurately reflects my personal performance of the history, physical exam, assessment and plan.

## 2021-05-19 NOTE — PROCEDURE
[FreeTextEntry1] : - After a discussion of risks and benefits, the patient agreed to proceed with a cortisone injection.  \par -  Side Injected: Left thumb carpometacarpal joint.\par -  Medications injected: 0.5cc of 1% Lidocaine and 1cc of 40mg of Depomedrol, using sterile technique.\par -  Patient tolerated the procedure well, without complications.\par -  Patient noted immediate relief of the symptoms, secondary to the anesthetic effects of the injection.\par -  Patient was told that the pain may worsen for a day or two, and should then begin to improve.\par -  Instructions: The patient was instructed on the use of ice, anti-inflammatory agents, or Tylenol, and activity modification.\par -  Follow-up: Within 4 weeks to assess the response to the injection.

## 2021-06-06 ENCOUNTER — TRANSCRIPTION ENCOUNTER (OUTPATIENT)
Age: 61
End: 2021-06-06

## 2021-07-20 ENCOUNTER — APPOINTMENT (OUTPATIENT)
Dept: UROGYNECOLOGY | Facility: CLINIC | Age: 61
End: 2021-07-20
Payer: COMMERCIAL

## 2021-07-20 VITALS
TEMPERATURE: 96.8 F | SYSTOLIC BLOOD PRESSURE: 142 MMHG | HEIGHT: 65 IN | BODY MASS INDEX: 31.49 KG/M2 | DIASTOLIC BLOOD PRESSURE: 88 MMHG | WEIGHT: 189 LBS | HEART RATE: 59 BPM

## 2021-07-20 DIAGNOSIS — N39.46 MIXED INCONTINENCE: ICD-10-CM

## 2021-07-20 LAB
BILIRUB UR QL STRIP: NORMAL
CLARITY UR: CLEAR
COLLECTION METHOD: NORMAL
GLUCOSE UR-MCNC: NORMAL
HCG UR QL: 0.2 EU/DL
HGB UR QL STRIP.AUTO: NORMAL
KETONES UR-MCNC: NORMAL
LEUKOCYTE ESTERASE UR QL STRIP: NORMAL
NITRITE UR QL STRIP: NORMAL
PH UR STRIP: 7.5
PROT UR STRIP-MCNC: NORMAL
SP GR UR STRIP: 1.01

## 2021-07-20 PROCEDURE — 99204 OFFICE O/P NEW MOD 45 MIN: CPT | Mod: 25

## 2021-07-20 PROCEDURE — 51701 INSERT BLADDER CATHETER: CPT

## 2021-07-20 NOTE — DISCUSSION/SUMMARY
[FreeTextEntry1] : \par Computer generated images were used to demonstrate her prolapse as well as explain treatment options. We reviewed management options for her prolapse including: observation, pelvic floor exercises with and without PT, pessary, and surgical management. We reviewed various surgical management options including vaginal, laparoscopic/robotic, abdominal procedures. We reviewed procedures involving hysterectomy as well as uterine sparing procedures. We also reviewed both mesh and non-mesh options. Written IUGA information on prolapse treatment options was also provided to her to review. She would like to try a pessary and will RTO for pessary fitting. She desires a pessary she can remove/insert on her own. IUGA information on pessaries provided.

## 2021-07-20 NOTE — HISTORY OF PRESENT ILLNESS
[Rectal Prolapse] : moderate [Unable To Restrain Bowel Movement] : mild [Feelings Of Urinary Urgency] : no [x1] : nocturia once nightly [Urinary Tract Infection] : moderate [] : years ago [de-identified] : daily [FreeTextEntry1] : \par \par \par PMH: depression, MVP, herpes\par PSH: foot surgery\par Social History: , nonsmoker, employed \par \par daily fluid intake: 1 L carbonated water, 4 white claws daily 3-4 mugs of coffee daily

## 2021-07-20 NOTE — PHYSICAL EXAM
[Chaperone Present] : A chaperone was present in the examining room during all aspects of the physical examination [Labia Majora] : were normal [Labia Minora] : were normal [Normal Appearance] : general appearance was normal [Normal] : no abnormalities [FreeTextEntry1] : General: Well, appearing. Alert and orientated. No acute distress\par HEENT: Normocephalic, atraumatic and extraocular muscles appear to be intact \par Neck: Full range of motion, no obvious lymphadenopathy, deformities, or masses noted \par Respiratory: Speaking in full sentences comfortably, normal work of breathing and no cough during visit\par Musculoskeletal: active full range of motion in extremities \par Extremities: No upper extremity edema noted\par Skin: no obvious rash or skin lesions\par Neuro: Orientated X 3, speech is fluent, normal rate\par Psych: Normal mood and affect \par   [Tenderness] : ~T no ~M abdominal tenderness observed [Distended] : not distended [Aa ____] : Aa [unfilled] [Ba ____] : Ba [unfilled] [C ____] : C [unfilled] [GH ____] : GH [unfilled] [PB ____] : PB [unfilled] [TVL ____] : TVL  [unfilled] [Ap ____] : Ap [unfilled] [Bp ____] : Bp [unfilled] [D ____] : D [unfilled] [Exam Deferred] : was deferred

## 2021-07-20 NOTE — PROCEDURE
[FreeTextEntry1] : \par Sterile straight catheterization was performed to rule out infection and to measure a postvoid residual volume which was 80 cc

## 2021-07-21 LAB
APPEARANCE: CLEAR
BACTERIA: NEGATIVE
BILIRUBIN URINE: NEGATIVE
BLOOD URINE: NEGATIVE
COLOR: NORMAL
GLUCOSE QUALITATIVE U: NEGATIVE
HYALINE CASTS: 0 /LPF
KETONES URINE: NEGATIVE
LEUKOCYTE ESTERASE URINE: NEGATIVE
MICROSCOPIC-UA: NORMAL
NITRITE URINE: NEGATIVE
PH URINE: 7.5
PROTEIN URINE: NEGATIVE
RED BLOOD CELLS URINE: 0 /HPF
SPECIFIC GRAVITY URINE: 1.01
SQUAMOUS EPITHELIAL CELLS: 0 /HPF
UROBILINOGEN URINE: NORMAL
WHITE BLOOD CELLS URINE: 0 /HPF

## 2021-07-22 LAB — BACTERIA UR CULT: NORMAL

## 2021-08-05 ENCOUNTER — APPOINTMENT (OUTPATIENT)
Dept: FAMILY MEDICINE | Facility: CLINIC | Age: 61
End: 2021-08-05
Payer: COMMERCIAL

## 2021-08-05 VITALS
SYSTOLIC BLOOD PRESSURE: 140 MMHG | RESPIRATION RATE: 14 BRPM | OXYGEN SATURATION: 98 % | TEMPERATURE: 98.2 F | HEART RATE: 61 BPM | DIASTOLIC BLOOD PRESSURE: 82 MMHG

## 2021-08-05 DIAGNOSIS — R10.84 GENERALIZED ABDOMINAL PAIN: ICD-10-CM

## 2021-08-05 DIAGNOSIS — R19.7 DIARRHEA, UNSPECIFIED: ICD-10-CM

## 2021-08-05 PROCEDURE — 99214 OFFICE O/P EST MOD 30 MIN: CPT

## 2021-08-05 NOTE — REVIEW OF SYSTEMS
[Abdominal Pain] : abdominal pain [Nausea] : no nausea [Diarrhea] : diarrhea [Vomiting] : no vomiting [Anxiety] : anxiety [Negative] : Genitourinary

## 2021-08-05 NOTE — PHYSICAL EXAM
[Soft] : abdomen soft [Non-distended] : non-distended [No Masses] : no abdominal mass palpated [Normal Bowel Sounds] : normal bowel sounds [Normal] : affect was normal and insight and judgment were intact [de-identified] : mildly diffuse tender in loewr badomen

## 2021-08-05 NOTE — HISTORY OF PRESENT ILLNESS
[FreeTextEntry8] : 62 y/o F PMhx Anxiety,Depression (Cymbalta) present with1 week hx of lower abdominal pain.  Under a lot of stress. Some lower back pain yesterday. BM's normal until 2 days ago until this am watery diarrhea. Denies blood in stool. Denies fever. Some abdominal bloating. Some gas . Diarrhea started 12 hours after eating salad .Pan intensity 5/10

## 2021-08-06 ENCOUNTER — TRANSCRIPTION ENCOUNTER (OUTPATIENT)
Age: 61
End: 2021-08-06

## 2021-08-10 ENCOUNTER — APPOINTMENT (OUTPATIENT)
Dept: UROGYNECOLOGY | Facility: CLINIC | Age: 61
End: 2021-08-10
Payer: COMMERCIAL

## 2021-08-10 DIAGNOSIS — N81.2 INCOMPLETE UTEROVAGINAL PROLAPSE: ICD-10-CM

## 2021-08-10 PROCEDURE — A4562: CPT

## 2021-08-10 PROCEDURE — 99214 OFFICE O/P EST MOD 30 MIN: CPT

## 2021-08-31 ENCOUNTER — TRANSCRIPTION ENCOUNTER (OUTPATIENT)
Age: 61
End: 2021-08-31

## 2021-08-31 ENCOUNTER — APPOINTMENT (OUTPATIENT)
Dept: UROGYNECOLOGY | Facility: CLINIC | Age: 61
End: 2021-08-31
Payer: COMMERCIAL

## 2021-08-31 ENCOUNTER — APPOINTMENT (OUTPATIENT)
Dept: FAMILY MEDICINE | Facility: CLINIC | Age: 61
End: 2021-08-31
Payer: COMMERCIAL

## 2021-08-31 ENCOUNTER — OUTPATIENT (OUTPATIENT)
Dept: OUTPATIENT SERVICES | Facility: HOSPITAL | Age: 61
LOS: 1 days | End: 2021-08-31
Payer: COMMERCIAL

## 2021-08-31 ENCOUNTER — APPOINTMENT (OUTPATIENT)
Dept: ULTRASOUND IMAGING | Facility: HOSPITAL | Age: 61
End: 2021-08-31
Payer: COMMERCIAL

## 2021-08-31 ENCOUNTER — NON-APPOINTMENT (OUTPATIENT)
Age: 61
End: 2021-08-31

## 2021-08-31 ENCOUNTER — RESULT REVIEW (OUTPATIENT)
Age: 61
End: 2021-08-31

## 2021-08-31 VITALS — DIASTOLIC BLOOD PRESSURE: 80 MMHG | TEMPERATURE: 97.8 F | SYSTOLIC BLOOD PRESSURE: 122 MMHG

## 2021-08-31 DIAGNOSIS — N81.11 CYSTOCELE, MIDLINE: ICD-10-CM

## 2021-08-31 DIAGNOSIS — M79.89 OTHER SPECIFIED SOFT TISSUE DISORDERS: ICD-10-CM

## 2021-08-31 DIAGNOSIS — R22.2 LOCALIZED SWELLING, MASS AND LUMP, TRUNK: ICD-10-CM

## 2021-08-31 DIAGNOSIS — M79.622 PAIN IN LEFT UPPER ARM: ICD-10-CM

## 2021-08-31 DIAGNOSIS — Z00.8 ENCOUNTER FOR OTHER GENERAL EXAMINATION: ICD-10-CM

## 2021-08-31 DIAGNOSIS — R60.9 EDEMA, UNSPECIFIED: ICD-10-CM

## 2021-08-31 PROCEDURE — 99213 OFFICE O/P EST LOW 20 MIN: CPT

## 2021-08-31 PROCEDURE — 76882 US LMTD JT/FCL EVL NVASC XTR: CPT | Mod: 26,LT

## 2021-08-31 PROCEDURE — 76882 US LMTD JT/FCL EVL NVASC XTR: CPT

## 2021-08-31 NOTE — HISTORY OF PRESENT ILLNESS
[FreeTextEntry8] : 62 y/o F PMhx Anxiety,Depression (Cymbalta) presents to office with painful lump under left armpit. pt states she received 1st Pfizer Aug 8th and painful lump came out 2 weeks ago. Second Pfizer yesterday Denies fever. Admits to shaving under armpits but states she is very careful.  Last Mammo 3/21 Had both vaccines in Left deltiod

## 2021-08-31 NOTE — REVIEW OF SYSTEMS
[All Other ROS] : all other reviewed systems are negative Mohs Rapid Report Verbiage: The area of clinically evident tumor was marked with skin marking ink and appropriately hatched.  The initial incision was made following the Mohs approach through the skin.  The specimen was taken to the lab, divided into the necessary number of pieces, chromacoded and processed according to the Mohs protocol.  This was repeated in successive stages until a tumor free defect was achieved.

## 2021-09-01 ENCOUNTER — NON-APPOINTMENT (OUTPATIENT)
Age: 61
End: 2021-09-01

## 2021-09-01 NOTE — PHYSICAL EXAM
[No Acute Distress] : in no acute distress [Well developed] : well developed [Well Nourished] : ~L well nourished [Good Hygeine] : demonstrates good hygeine [Oriented x3] : oriented to person, place, and time [Normal Memory] : ~T memory was ~L unimpaired [Normal Mood/Affect] : mood and affect are normal [Warm and Dry] : was warm and dry to touch [Normal Gait] : gait was normal [Vulvar Atrophy] : vulvar atrophy [Labia Majora] : were normal [Labia Minora] : were normal [Atrophy] : atrophy [Dry Mucosa] : dry mucosa [Uterine Prolapse] : uterine prolapse [No Bleeding] : there was no active vaginal bleeding [None] : no CVA tenderness [Normal] : normal [Anxiety] : patient is not anxious [Tenderness] : ~T no ~M abdominal tenderness observed [Distended] : not distended

## 2021-09-01 NOTE — PROCEDURE
[Good Fit] : fits well [Pessary Inserted] : inserted [None] : no bleeding [Fluid Management] : Fluid Management: patient verbalizes understanding 6-10 cups per day [Bowel Management] : Bowel Management: patient verbalizes understanding of daily dietary fiber intake [Discharge] : there is vaginal discharge [Pessary Washed] : washed [H2O] : H2O [Refit] : refit is not needed [Erosion] : no evidence of erosion [Erythema] : no erythema [Infection] : no evidence of infection [FreeTextEntry1] : Cube #3 [de-identified] : yellow leukorrhea [FreeTextEntry5] : Remain hydrated [FreeTextEntry4] : Avoid constipation [FreeTextEntry8] : Routine pericare reinforced. Educated patient how to take pessary out and put back in place. Patient verbalized understanding and returned demonstration.

## 2021-09-01 NOTE — HISTORY OF PRESENT ILLNESS
[FreeTextEntry1] : Katty is a 61 year old F with incomplete uterovaginal prolapse and midline cystocele who presents to office for follow up and pessary check. Patient was seen on 8/10/2021 and fit with pessary Cube #3.  States she is doing well with this pessary. She is happy with the support. Denies pelvic pain, pressure or vaginal bleeding. She is wanting to learn how to perform pessary self-care today. Denies urinary or bowel complaints.

## 2021-09-01 NOTE — DISCUSSION/SUMMARY
[FreeTextEntry1] : 1. POP\par - Patient doing well with pessary\par - Pessary precautions and instructions reviewed, verbalized understanding\par - Patient may perform pessary self-care every 2 weeks or sooner as needed\par \par Will RTO for follow up of POP and pessary maintenance in 6 months or sooner if needed. Patient agrees to call office with any questions or concerns, verbalized understanding. \par

## 2021-09-07 ENCOUNTER — APPOINTMENT (OUTPATIENT)
Dept: ULTRASOUND IMAGING | Facility: IMAGING CENTER | Age: 61
End: 2021-09-07

## 2021-09-14 ENCOUNTER — NON-APPOINTMENT (OUTPATIENT)
Age: 61
End: 2021-09-14

## 2021-10-25 ENCOUNTER — NON-APPOINTMENT (OUTPATIENT)
Age: 61
End: 2021-10-25

## 2021-11-03 ENCOUNTER — APPOINTMENT (OUTPATIENT)
Dept: ORTHOPEDIC SURGERY | Facility: CLINIC | Age: 61
End: 2021-11-03

## 2021-11-22 ENCOUNTER — NON-APPOINTMENT (OUTPATIENT)
Age: 61
End: 2021-11-22

## 2021-12-01 ENCOUNTER — APPOINTMENT (OUTPATIENT)
Dept: ORTHOPEDIC SURGERY | Facility: CLINIC | Age: 61
End: 2021-12-01
Payer: COMMERCIAL

## 2021-12-01 PROCEDURE — 20606 DRAIN/INJ JOINT/BURSA W/US: CPT | Mod: LT

## 2021-12-01 PROCEDURE — 99214 OFFICE O/P EST MOD 30 MIN: CPT | Mod: 25

## 2021-12-01 RX ORDER — BETAMETHA AC,SOD PHOS/WATER/PF 6 MG/ML
6 (3-3) VIAL (ML) INJECTION
Qty: 1 | Refills: 0 | Status: COMPLETED | OUTPATIENT
Start: 2021-12-01

## 2021-12-01 RX ORDER — LIDOCAINE HYDROCHLORIDE 10 MG/ML
1 INJECTION, SOLUTION INFILTRATION; PERINEURAL
Refills: 0 | Status: COMPLETED | OUTPATIENT
Start: 2021-12-01

## 2021-12-01 RX ADMIN — BETAMETHASONE SODIUM PHOSPHATE AND BETAMETHASONE ACETATE 1 MG/ML: 3; 3 INJECTION, SUSPENSION INTRA-ARTICULAR; INTRALESIONAL; INTRAMUSCULAR; SOFT TISSUE at 00:00

## 2021-12-01 RX ADMIN — LIDOCAINE HYDROCHLORIDE 1 %: 10 INJECTION, SOLUTION INFILTRATION; PERINEURAL at 00:00

## 2021-12-01 NOTE — HISTORY OF PRESENT ILLNESS
[FreeTextEntry1] : Follow-up regarding left wrist and thumb pain secondary to basal joint arthritis with an element of tendinitis.  She also has Dupuytren's disease along the left ring finger ray without a contracture.\par \par See note from when she was seen in the office greater than 6 months ago.  She was given a cortisone injection at the left thumb basal joint.\par \par She returns today and reports relief from the cortisone given to the left thumb basal joint. The finger has become painful and swollen once again as of about 2 months ago. She additionally notes pain through the left wrist and localized her pain to the area of the first dorsal compartment. She additionally complains of right thumb pain and rates her pain as a 5 out of 10.

## 2021-12-01 NOTE — ADDENDUM
[FreeTextEntry1] : I, Karrie Ricks, acted solely as a scribe for Dr. Edwards on this date on 12/01/2021.

## 2021-12-01 NOTE — PHYSICAL EXAM
[de-identified] : - Constitutional: This is a female in no obvious distress.  \par - Psych: Patient is alert and oriented to person, place and time.  Patient has a normal mood and affect.\par - Cardiovascular: Normal pulses throughout the upper extremities.  No significant varicosities are noted in the upper extremities. \par - Neuro: Strength and sensation are intact throughout the upper extremities.  Patient has normal coordination.\par - Respiratory:  Patient exhibits no evidence of shortness of breath or difficulty breathing.\par - Skin: No rashes, lesions, or other abnormalities are noted in the upper extremities.\par \par ---\par \par Examination of her left wrist and hand demonstrates minimal swelling along the CMC joint of the thumb.  There is mild tenderness along the CMC joint.  She is much more swollen and tender along the first dorsal compartment.  She has a positive Finkelstein sign.  There is no evidence of a trigger thumb.  She has thickening of the palmar fascia with nodules along the ring finger ray consistent with Dupuytren's disease.  There is no contracture.  She is neurovascularly intact distally.\par \par Examination of her right hand demonstrates mild tenderness along the CMC joint of the thumb without swelling.  There is no evidence of trigger thumb or de Quervain's tendinitis.  She is neurovascularly intact distally. [de-identified] : Previous PA, lateral, and oblique radiographs of the left wrist and hand demonstrated moderate to advanced basal joint arthritis of the thumb.

## 2021-12-01 NOTE — PROCEDURE
[FreeTextEntry1] : -  After a discussion of risks and benefits, the patient agreed to proceed with a cortisone injection.  \par -  Side: Left first dorsal compartment. Ultrasound guidance was used to confirm the injection within the left first dorsal compartment. \par -  Medications injected: 1 cc of 1% Lidocaine and 1 cc of betamethasone, using sterile technique.\par -  Patient tolerated the procedure well, without complications.\par -  Immediate improvement of the symptoms, secondary to the anesthetic effects of the injection, was noted.\par -  Instructions:  The patient was told that the symptoms should hopefully begin to improve within the next several days. The use of ice, anti-inflammatory agents or Tylenol, and  activity modification was discussed. \par -  Follow-up: Within 4 weeks to assess response to the injection.

## 2021-12-01 NOTE — DISCUSSION/SUMMARY
[FreeTextEntry1] : I had a discussion regarding today's visit, the diagnosis and treatment recommendations and options.  We also discussed changes since the last visit.  At this time, we discussed that more of her symptoms are emanating more secondary to de Quervain's tendinitis and less so secondary to basal joint. We discussed treatment options of a cortisone injection into the tendon sheath of the first dorsal compartment. She has agreed to proceed. I did tell her that if the cortisone injection to the first dorsal compartment does not provide relief, I would recommend reinjecting the left thumb CMC joint.\par \par Finally, she has mild right hand pain secondary to probable mild basal joint arthritis.  I recommended observation and told her that she may note some improvement from the systemic effects of the cortisone injection.  If her symptoms worsen at the right thumb, then she will follow-up for x-rays and possibly a cortisone injection there.\par \par The patient has agreed to the above plan of management and has expressed full understanding.  All questions were fully answered to the patient's satisfaction.\par \par My cumulative time spent on today's visit was greater than 30 minutes and included: Preparation for the visit, review of the medical records, review of pertinent diagnostic studies, examination and counseling of the patient on the above diagnosis, treatment plan and prognosis, orders of diagnostic tests, medications and/or appropriate procedures and documentation in the medical records of today's visit.

## 2021-12-01 NOTE — END OF VISIT
[FreeTextEntry3] : This note was written by Karrie Ricks on 12/01/2021 acting solely as a scribe for Dr. Yogesh Edwards.\par  \par All medical record entries made by the Scribe were at my, Dr. Yogesh Edwards, direction and personally dictated by me on 12/01/2021. I have personally reviewed the chart and agree that the record accurately reflects my personal performance of the history, physical exam, assessment and plan.

## 2022-03-31 ENCOUNTER — APPOINTMENT (OUTPATIENT)
Dept: MRI IMAGING | Facility: IMAGING CENTER | Age: 62
End: 2022-03-31
Payer: COMMERCIAL

## 2022-03-31 ENCOUNTER — OUTPATIENT (OUTPATIENT)
Dept: OUTPATIENT SERVICES | Facility: HOSPITAL | Age: 62
LOS: 1 days | End: 2022-03-31
Payer: COMMERCIAL

## 2022-03-31 DIAGNOSIS — G45.9 TRANSIENT CEREBRAL ISCHEMIC ATTACK, UNSPECIFIED: ICD-10-CM

## 2022-03-31 PROCEDURE — 70551 MRI BRAIN STEM W/O DYE: CPT

## 2022-03-31 PROCEDURE — 70551 MRI BRAIN STEM W/O DYE: CPT | Mod: 26

## 2022-04-26 ENCOUNTER — RX RENEWAL (OUTPATIENT)
Age: 62
End: 2022-04-26

## 2022-07-26 ENCOUNTER — APPOINTMENT (OUTPATIENT)
Dept: FAMILY MEDICINE | Facility: CLINIC | Age: 62
End: 2022-07-26

## 2022-09-13 ENCOUNTER — NON-APPOINTMENT (OUTPATIENT)
Age: 62
End: 2022-09-13

## 2022-09-13 PROBLEM — M18.12 PRIMARY OSTEOARTHRITIS OF FIRST CARPOMETACARPAL JOINT OF LEFT HAND: Status: ACTIVE | Noted: 2021-05-19

## 2022-09-13 PROBLEM — M72.0 DUPUYTREN'S DISEASE OF PALM OF LEFT HAND: Status: ACTIVE | Noted: 2021-05-19

## 2022-09-16 ENCOUNTER — NON-APPOINTMENT (OUTPATIENT)
Age: 62
End: 2022-09-16

## 2022-09-16 ENCOUNTER — APPOINTMENT (OUTPATIENT)
Dept: ORTHOPEDIC SURGERY | Facility: CLINIC | Age: 62
End: 2022-09-16

## 2022-09-16 DIAGNOSIS — M65.4 RADIAL STYLOID TENOSYNOVITIS [DE QUERVAIN]: ICD-10-CM

## 2022-09-16 DIAGNOSIS — M18.12 UNILATERAL PRIMARY OSTEOARTHRITIS OF FIRST CARPOMETACARPAL JOINT, LEFT HAND: ICD-10-CM

## 2022-09-16 DIAGNOSIS — M72.0 PALMAR FASCIAL FIBROMATOSIS [DUPUYTREN]: ICD-10-CM

## 2022-09-16 PROCEDURE — 20606 DRAIN/INJ JOINT/BURSA W/US: CPT | Mod: LT

## 2022-09-16 PROCEDURE — 99214 OFFICE O/P EST MOD 30 MIN: CPT | Mod: 25

## 2022-09-16 RX ORDER — LIDOCAINE HYDROCHLORIDE 10 MG/ML
1 INJECTION, SOLUTION INFILTRATION; PERINEURAL
Refills: 0 | Status: COMPLETED | OUTPATIENT
Start: 2022-09-16

## 2022-09-16 RX ORDER — BETAMETHA AC,SOD PHOS/WATER/PF 6 MG/ML
6 (3-3) VIAL (ML) INJECTION
Qty: 1 | Refills: 0 | Status: COMPLETED | OUTPATIENT
Start: 2022-09-16

## 2022-09-16 RX ADMIN — Medication %: at 00:00

## 2022-09-16 RX ADMIN — Medication MG/ML: at 00:00

## 2022-09-16 NOTE — PROCEDURE
[FreeTextEntry1] : -  After a discussion of risks and benefits, the patient agreed to proceed with a cortisone injection.  \par -  Side: Left first dorsal compartment.\par -  Medications injected: 1 cc of 1% Lidocaine and 1 cc of betamethasone, using sterile technique.\par -  Ultrasound guidance: Ultrasound was used for diagnostic and therapeutic purposes. Ultrasound confirmed correct needle localization within the first dorsal compartment, prior to the ultrasound.\par -  Patient tolerated the procedure well, without complications.\par -  Immediate improvement of the symptoms, secondary to the anesthetic effects of the injection, was noted.\par -  Instructions:  The patient was told that the symptoms should hopefully begin to improve within the next several days. The use of ice, anti-inflammatory agents or Tylenol, and  activity modification was discussed. \par -  Follow-up: According to her symptoms.

## 2022-09-16 NOTE — HISTORY OF PRESENT ILLNESS
[FreeTextEntry1] : Follow-up regarding left wrist and thumb pain secondary to basal joint arthritis and de Quervain's tendinitis.  She also has Dupuytren's disease along the left ring finger ray without a contracture.\par \par She was previously given a cortisone injection at the left thumb basal joint.  When she was last seen in the office almost 10 months ago, she was given a cortisone injection at the left wrist first dorsal compartment.\par \par She returns today, with a gross increase in her pain to the left thumb and wrist. She has been wearing a thumb spica brace with relief. She states the previous injections were helpful in treating her symptoms. She takes Tylenol arthritis as needed for pain. She rates her pain as a 5 out of 10 at this time. \par \par She also has complains of a cord consistent with Dupuytren's disease along the ring finger. She has been seen for this in the past but feels as if it has worsened. She does not have a flexion contracture.

## 2022-09-16 NOTE — DISCUSSION/SUMMARY
[FreeTextEntry1] : I had a discussion regarding today's visit, the diagnosis and treatment recommendations and options.  We also discussed changes since the last visit.  With regard to the left wrist, we discussed treatment options consisting of a repeat cortisone injection at the first dorsal compartment versus continued bracing and use of Tylenol as needed. She stated the brace and Tylenol only provide her with short term relief and she opted to proceed with a repeat cortisone injection to the first dorsal compartment. She understands that this may not provide her with long term relief, and if it does not I would recommend she return to the office to discuss further treatment recommendations. \par \par With regard to the left ring finger, we again discussed the etiology and nature of Dupuytren's disease. As she is not symptomatic, I have recommended observation. She will follow up as needed, according to her symptoms in this regard. \par \par The patient has agreed to the above plan of management and has expressed full understanding.  All questions were fully answered to the patient's satisfaction.\par \par My cumulative time spent on today's visit was greater than 30 minutes and included: Preparation for the visit, review of the medical records, review of pertinent diagnostic studies, examination and counseling of the patient on the above diagnosis, treatment plan and prognosis, orders of diagnostic tests, medications and/or appropriate procedures and documentation in the medical records of today's visit.

## 2022-09-16 NOTE — END OF VISIT
[FreeTextEntry3] : This note was written by Karrie Ricks on 09/16/2022 acting solely as a scribe for Dr. Yogesh Edwards.\par  \par All medical record entries made by the Scribe were at my, Dr. Yogesh Edwards, direction and personally dictated by me on 09/16/2022. I have personally reviewed the chart and agree that the record accurately reflects my personal performance of the history, physical exam, assessment and plan.

## 2022-09-16 NOTE — PHYSICAL EXAM
[de-identified] : - Constitutional: This is a female in no obvious distress.  \par - Psych: Patient is alert and oriented to person, place and time.  Patient has a normal mood and affect.\par - Cardiovascular: Normal pulses throughout the upper extremities.  No significant varicosities are noted in the upper extremities. \par - Neuro: Strength and sensation are intact throughout the upper extremities.  Patient has normal coordination.\par - Respiratory:  Patient exhibits no evidence of shortness of breath or difficulty breathing.\par - Skin: No rashes, lesions, or other abnormalities are noted in the upper extremities.\par \par ---\par \par Examination of her left wrist and hand demonstrates minimal swelling along the CMC joint of the thumb.  There is mild tenderness along the CMC joint.  She is much more swollen and tender along the first dorsal compartment.  She has a positive Finkelstein sign.  There is no evidence of a trigger thumb.  She has thickening of the palmar fascia with nodules along the ring finger ray consistent with Dupuytren's disease.  There is no contracture.  She is neurovascularly intact distally. [de-identified] : Previous PA, lateral, and oblique radiographs of the left wrist and hand demonstrated moderate to advanced basal joint arthritis of the thumb.

## 2022-09-16 NOTE — ADDENDUM
[FreeTextEntry1] : I, Karrie Ricks, acted solely as a scribe for Dr. Edwards on this date on 09/16/2022.

## 2022-09-23 ENCOUNTER — APPOINTMENT (OUTPATIENT)
Dept: FAMILY MEDICINE | Facility: CLINIC | Age: 62
End: 2022-09-23

## 2022-09-23 VITALS
WEIGHT: 194 LBS | TEMPERATURE: 98.2 F | SYSTOLIC BLOOD PRESSURE: 116 MMHG | HEART RATE: 68 BPM | OXYGEN SATURATION: 97 % | HEIGHT: 65 IN | DIASTOLIC BLOOD PRESSURE: 74 MMHG | RESPIRATION RATE: 14 BRPM | BODY MASS INDEX: 32.32 KG/M2

## 2022-09-23 DIAGNOSIS — F32.A ANXIETY DISORDER, UNSPECIFIED: ICD-10-CM

## 2022-09-23 DIAGNOSIS — Z12.31 ENCOUNTER FOR SCREENING MAMMOGRAM FOR MALIGNANT NEOPLASM OF BREAST: ICD-10-CM

## 2022-09-23 DIAGNOSIS — I10 ESSENTIAL (PRIMARY) HYPERTENSION: ICD-10-CM

## 2022-09-23 DIAGNOSIS — F41.9 ANXIETY DISORDER, UNSPECIFIED: ICD-10-CM

## 2022-09-23 DIAGNOSIS — Z12.11 ENCOUNTER FOR SCREENING FOR MALIGNANT NEOPLASM OF COLON: ICD-10-CM

## 2022-09-23 PROCEDURE — 99214 OFFICE O/P EST MOD 30 MIN: CPT

## 2022-09-23 RX ORDER — NAPROXEN 500 MG/1
500 TABLET ORAL
Qty: 60 | Refills: 1 | Status: COMPLETED | COMMUNITY
Start: 2017-03-01 | End: 2022-09-23

## 2022-09-23 RX ORDER — DICLOFENAC SODIUM 75 MG/1
75 TABLET, DELAYED RELEASE ORAL
Qty: 30 | Refills: 0 | Status: COMPLETED | COMMUNITY
Start: 2019-11-19 | End: 2022-09-23

## 2022-09-23 RX ORDER — DULOXETINE HYDROCHLORIDE 30 MG/1
CAPSULE, DELAYED RELEASE ORAL
Refills: 0 | Status: COMPLETED | COMMUNITY
End: 2022-09-23

## 2022-09-23 RX ORDER — OSELTAMIVIR PHOSPHATE 75 MG/1
75 CAPSULE ORAL TWICE DAILY
Qty: 10 | Refills: 0 | Status: COMPLETED | COMMUNITY
Start: 2018-02-01 | End: 2022-09-23

## 2022-09-23 RX ORDER — VALACYCLOVIR 500 MG/1
500 TABLET, FILM COATED ORAL
Qty: 4 | Refills: 0 | Status: COMPLETED | COMMUNITY
Start: 2018-02-14 | End: 2022-09-23

## 2022-09-23 RX ORDER — MUPIROCIN 20 MG/G
2 OINTMENT TOPICAL TWICE DAILY
Qty: 1 | Refills: 0 | Status: COMPLETED | COMMUNITY
Start: 2017-12-18 | End: 2022-09-23

## 2022-09-23 RX ORDER — MELOXICAM 15 MG/1
15 TABLET ORAL DAILY
Qty: 30 | Refills: 0 | Status: COMPLETED | COMMUNITY
Start: 2020-06-04 | End: 2022-09-23

## 2022-09-23 RX ORDER — PROMETHAZINE HYDROCHLORIDE AND DEXTROMETHORPHAN HYDROBROMIDE ORAL SOLUTION 15; 6.25 MG/5ML; MG/5ML
6.25-15 SOLUTION ORAL
Qty: 1 | Refills: 0 | Status: COMPLETED | COMMUNITY
Start: 2018-02-01 | End: 2022-09-23

## 2022-09-23 RX ORDER — AZITHROMYCIN 250 MG/1
250 TABLET, FILM COATED ORAL
Qty: 6 | Refills: 0 | Status: COMPLETED | COMMUNITY
Start: 2018-02-01 | End: 2022-09-23

## 2022-09-29 PROBLEM — I10 BENIGN ESSENTIAL HYPERTENSION: Status: ACTIVE | Noted: 2022-09-23

## 2022-09-29 NOTE — ASSESSMENT
[FreeTextEntry1] : VSS- exam normal \par c/w current medications\par Advised healthy diet and exercise\par referred as above\par patient verbalizes understanding and patient is stable upon discharge\par

## 2022-09-29 NOTE — HISTORY OF PRESENT ILLNESS
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 Noe Ramirez \par thought possible stress related \par scheduled holter monitor \par had a stress test and echo done which was normal about a few months ago\par denies any other associated symptoms\par denies any other complaints or concerns at this time

## 2022-09-29 NOTE — PHYSICAL EXAM
[Coordination Grossly Intact] : coordination grossly intact [No Focal Deficits] : no focal deficits [Normal Gait] : normal gait [Normal] : no jugular venous distention, supple, no lymphadenopathy and the thyroid was normal and there were no nodules present

## 2022-10-06 ENCOUNTER — TRANSCRIPTION ENCOUNTER (OUTPATIENT)
Age: 62
End: 2022-10-06

## 2022-10-14 ENCOUNTER — APPOINTMENT (OUTPATIENT)
Dept: FAMILY MEDICINE | Facility: CLINIC | Age: 62
End: 2022-10-14

## 2022-12-11 ENCOUNTER — NON-APPOINTMENT (OUTPATIENT)
Age: 62
End: 2022-12-11

## 2022-12-15 ENCOUNTER — APPOINTMENT (OUTPATIENT)
Dept: FAMILY MEDICINE | Facility: CLINIC | Age: 62
End: 2022-12-15

## 2022-12-15 VITALS
RESPIRATION RATE: 14 BRPM | DIASTOLIC BLOOD PRESSURE: 82 MMHG | TEMPERATURE: 97.2 F | HEART RATE: 71 BPM | OXYGEN SATURATION: 99 % | SYSTOLIC BLOOD PRESSURE: 130 MMHG

## 2022-12-15 DIAGNOSIS — R21 RASH AND OTHER NONSPECIFIC SKIN ERUPTION: ICD-10-CM

## 2022-12-15 DIAGNOSIS — R92.2 INCONCLUSIVE MAMMOGRAM: ICD-10-CM

## 2022-12-15 DIAGNOSIS — Z12.39 ENCOUNTER FOR OTHER SCREENING FOR MALIGNANT NEOPLASM OF BREAST: ICD-10-CM

## 2022-12-15 DIAGNOSIS — S30.861A INSECT BITE (NONVENOMOUS) OF ABDOMINAL WALL, INITIAL ENCOUNTER: ICD-10-CM
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
## 2022-12-15 PROCEDURE — 99214 OFFICE O/P EST MOD 30 MIN: CPT

## 2022-12-15 RX ORDER — MUPIROCIN 20 MG/G
2 OINTMENT TOPICAL TWICE DAILY
Qty: 1 | Refills: 0 | Status: ACTIVE | COMMUNITY
Start: 2022-12-15 | End: 1900-01-01

## 2022-12-15 NOTE — ASSESSMENT
[FreeTextEntry1] : local reaction to bug bite- possible tick bite\par received prophylactic course of doxcycline within timeframe \par advised of lyme symptoms (including but not limited to fatigue, fever, chills, rash, joint pain, etc)- if any present, will start on doxy BID x 3 weeks\par if asymptomatic, advised to do labs in 4-6 weeks\par VSS, exam normal\par advised to schedule CPE \par follow up in office sooner if sx persists or worsens\par patient verbalizes understanding and is stable upon d/c\par

## 2022-12-15 NOTE — PHYSICAL EXAM
[Normal] : soft, non-tender, non-distended, no masses palpated, no HSM and normal bowel sounds [de-identified] : erythematous papule with central punctum with scarring, blanching, non tender to palpation

## 2022-12-15 NOTE — HISTORY OF PRESENT ILLNESS
[FreeTextEntry8] : c/o recent tick bite \[place] with [place] dog regulalry \par tick bite on left hip/pelvis area\par pulled something out on sunday night \par it was late at night \par felt soreness on left hip the next day \par went to urgent care on monday night \par was given prophylactic course of doxycycline 200 mg \par took it on tuesday night\par area was mildly itchy \par denies any headches, joint pain, rash, fever, chills, or fatigue\par overall is feeling well\par denies any other associated symptoms \par denies any other complaints or concerns at this time\par

## 2023-05-02 NOTE — END OF VISIT
Attempted to contact patient. No answer, unable to leave voice mail.    Regarding test results.   
[FreeTextEntry3] : This note was written by Karrie Cabral on 10/23/2020 acting solely as a scribe for Dr. Yogesh Edwards.\par  \par All medical record entries made by the Scribe were at my, Dr. Yogesh Edwards, direction and personally dictated by me on 10/23/2020. I have personally reviewed the chart and agree that the record accurately reflects my personal performance of the history, physical exam, assessment and plan.

## 2023-05-07 ENCOUNTER — NON-APPOINTMENT (OUTPATIENT)
Age: 63
End: 2023-05-07

## 2023-05-08 ENCOUNTER — APPOINTMENT (OUTPATIENT)
Dept: FAMILY MEDICINE | Facility: CLINIC | Age: 63
End: 2023-05-08
Payer: COMMERCIAL

## 2023-05-08 DIAGNOSIS — U07.1 COVID-19: ICD-10-CM

## 2023-05-08 PROCEDURE — 99213 OFFICE O/P EST LOW 20 MIN: CPT | Mod: 95

## 2023-05-08 NOTE — HISTORY OF PRESENT ILLNESS
[Home] : at home, [unfilled] , at the time of the visit. [Medical Office: ([facility])___] : at the medical office located in  [Verbal consent obtained from patient] : the patient, [unfilled] [FreeTextEntry8] : 62 y/o F presentrs via Telehealth .Tested + Covid yesterday. Sx  URI started 3 days ago. Did Home test +. Went to  today for Covid PCR. Feeling better today. Body aches resolving today. Less coughing and URI sx. T max  100.2

## 2023-05-08 NOTE — REVIEW OF SYSTEMS
[Chills] : chills [Fatigue] : fatigue [Postnasal Drip] : postnasal drip [Cough] : cough [Muscle Pain] : no muscle pain [Negative] : Psychiatric

## 2023-06-01 ENCOUNTER — NON-APPOINTMENT (OUTPATIENT)
Age: 63
End: 2023-06-01

## 2023-08-10 ENCOUNTER — APPOINTMENT (OUTPATIENT)
Dept: FAMILY MEDICINE | Facility: CLINIC | Age: 63
End: 2023-08-10
Payer: COMMERCIAL

## 2023-08-10 VITALS
OXYGEN SATURATION: 95 % | HEIGHT: 65 IN | HEART RATE: 65 BPM | WEIGHT: 195 LBS | RESPIRATION RATE: 14 BRPM | SYSTOLIC BLOOD PRESSURE: 132 MMHG | TEMPERATURE: 96.4 F | BODY MASS INDEX: 32.49 KG/M2 | DIASTOLIC BLOOD PRESSURE: 80 MMHG

## 2023-08-10 DIAGNOSIS — J06.9 ACUTE UPPER RESPIRATORY INFECTION, UNSPECIFIED: ICD-10-CM

## 2023-08-10 DIAGNOSIS — Z12.39 ENCOUNTER FOR OTHER SCREENING FOR MALIGNANT NEOPLASM OF BREAST: ICD-10-CM

## 2023-08-10 DIAGNOSIS — J34.89 OTHER SPECIFIED DISORDERS OF NOSE AND NASAL SINUSES: ICD-10-CM

## 2023-08-10 PROCEDURE — 99214 OFFICE O/P EST MOD 30 MIN: CPT

## 2023-08-10 RX ORDER — AMOXICILLIN AND CLAVULANATE POTASSIUM 875; 125 MG/1; MG/1
875-125 TABLET, COATED ORAL TWICE DAILY
Qty: 14 | Refills: 0 | Status: ACTIVE | COMMUNITY
Start: 2023-08-10 | End: 1900-01-01

## 2023-08-10 NOTE — HISTORY OF PRESENT ILLNESS
[FreeTextEntry8] : 64 y/o F presents c/o sinus congesiton , ST and low grade temp for 3 days. Admits to sinus pressure.. No bidy aches but admitrs to sweating and neck (posteriors) pain, No travel,. Had Covid 3 months ago. No treatment. Mother passed away 1 months ago stressed and fatigued.

## 2023-08-14 ENCOUNTER — NON-APPOINTMENT (OUTPATIENT)
Age: 63
End: 2023-08-14

## 2023-10-02 DIAGNOSIS — B00.1 HERPESVIRAL VESICULAR DERMATITIS: ICD-10-CM

## 2023-10-02 RX ORDER — VALACYCLOVIR 1 G/1
1 TABLET, FILM COATED ORAL
Qty: 20 | Refills: 1 | Status: ACTIVE | COMMUNITY
Start: 2023-10-02 | End: 1900-01-01

## 2023-12-28 RX ORDER — VALACYCLOVIR 1 G/1
1 TABLET, FILM COATED ORAL
Qty: 4 | Refills: 3 | Status: ACTIVE | COMMUNITY
Start: 2019-11-05 | End: 1900-01-01

## 2024-01-17 ENCOUNTER — RESULT REVIEW (OUTPATIENT)
Age: 64
End: 2024-01-17

## 2024-01-17 ENCOUNTER — APPOINTMENT (OUTPATIENT)
Dept: ULTRASOUND IMAGING | Facility: HOSPITAL | Age: 64
End: 2024-01-17
Payer: COMMERCIAL

## 2024-01-17 ENCOUNTER — OUTPATIENT (OUTPATIENT)
Dept: OUTPATIENT SERVICES | Facility: HOSPITAL | Age: 64
LOS: 1 days | End: 2024-01-17
Payer: COMMERCIAL

## 2024-01-17 ENCOUNTER — APPOINTMENT (OUTPATIENT)
Dept: MAMMOGRAPHY | Facility: HOSPITAL | Age: 64
End: 2024-01-17
Payer: COMMERCIAL

## 2024-01-17 DIAGNOSIS — Z12.39 ENCOUNTER FOR OTHER SCREENING FOR MALIGNANT NEOPLASM OF BREAST: ICD-10-CM

## 2024-01-17 PROCEDURE — 76641 ULTRASOUND BREAST COMPLETE: CPT | Mod: 26,50

## 2024-01-17 PROCEDURE — 77063 BREAST TOMOSYNTHESIS BI: CPT | Mod: 26

## 2024-01-17 PROCEDURE — 77065 DX MAMMO INCL CAD UNI: CPT | Mod: 26,GG,LT

## 2024-01-17 PROCEDURE — 77067 SCR MAMMO BI INCL CAD: CPT | Mod: 26

## 2024-01-17 PROCEDURE — 76641 ULTRASOUND BREAST COMPLETE: CPT

## 2024-01-17 PROCEDURE — 77063 BREAST TOMOSYNTHESIS BI: CPT

## 2024-01-17 PROCEDURE — 77065 DX MAMMO INCL CAD UNI: CPT

## 2024-01-17 PROCEDURE — 77067 SCR MAMMO BI INCL CAD: CPT

## 2024-01-30 LAB — SARS-COV-2 N GENE NPH QL NAA+PROBE: NOT DETECTED

## 2024-03-04 NOTE — REASON FOR VISIT
[Follow-Up Visit_____] : a follow-up visit for [unfilled] hand grasp, leg strength strong and equal bilaterally

## 2024-04-14 ENCOUNTER — RX RENEWAL (OUTPATIENT)
Age: 64
End: 2024-04-14

## 2024-04-15 ENCOUNTER — RX RENEWAL (OUTPATIENT)
Age: 64
End: 2024-04-15

## 2024-04-15 RX ORDER — DULOXETINE HYDROCHLORIDE 60 MG/1
60 CAPSULE, DELAYED RELEASE PELLETS ORAL
Qty: 90 | Refills: 1 | Status: ACTIVE | COMMUNITY
Start: 2018-10-24 | End: 1900-01-01

## 2024-11-06 NOTE — HEALTH RISK ASSESSMENT
[Patient reported PAP Smear was normal] : Patient reported PAP Smear was normal [PapSmearDate] : 4/27/20 [ColonoscopyComments] : reefrral today Well appearing, awake, alert, oriented to person, place, time/situation and in no apparent distress. normal...

## 2024-12-07 NOTE — PHYSICAL EXAM
[Normal] : no acute distress, well nourished, well developed and well-appearing [Normal Appearance] : normal in appearance [No Nipple Discharge] : no nipple discharge [de-identified] : Left axillary painful swollen axills no mass palpated left brast larger than right Yes